# Patient Record
Sex: FEMALE | Race: WHITE | NOT HISPANIC OR LATINO | URBAN - METROPOLITAN AREA
[De-identification: names, ages, dates, MRNs, and addresses within clinical notes are randomized per-mention and may not be internally consistent; named-entity substitution may affect disease eponyms.]

---

## 2020-07-25 ENCOUNTER — TELEPHONE ENCOUNTER (OUTPATIENT)
Dept: URBAN - METROPOLITAN AREA CLINIC 13 | Facility: CLINIC | Age: 40
End: 2020-07-25

## 2020-07-26 ENCOUNTER — TELEPHONE ENCOUNTER (OUTPATIENT)
Dept: URBAN - METROPOLITAN AREA CLINIC 13 | Facility: CLINIC | Age: 40
End: 2020-07-26

## 2020-07-26 RX ORDER — FERROUS GLUCONATE 256(28)MG
TAKE 1 TABLET DAILY AS DIRECTED TABLET ORAL
Refills: 0 | Status: ACTIVE | COMMUNITY

## 2020-07-26 RX ORDER — ACETAMINOPHEN 325 MG/1
TAKE 1 TO 2 TABLETS EVERY 6 HOURS AS NEEDED TABLET, FILM COATED ORAL
Refills: 0 | Status: ACTIVE | COMMUNITY

## 2020-07-26 RX ORDER — DOCUSATE SODIUM 100 MG/1
TAKE 1 CAPSULE 3 TIMES DAILY CAPSULE ORAL
Refills: 0 | Status: ACTIVE | COMMUNITY

## 2020-07-26 RX ORDER — NITROGLYCERIN 4 MG/G
INSERT 1 APPLICATOR EVERY TWELVE HOURS OINTMENT RECTAL
Qty: 30 | Refills: 0 | Status: ACTIVE | COMMUNITY
Start: 2015-10-12

## 2024-10-04 ENCOUNTER — OFFICE VISIT (OUTPATIENT)
Dept: URBAN - METROPOLITAN AREA CLINIC 113 | Facility: CLINIC | Age: 44
End: 2024-10-04
Payer: COMMERCIAL

## 2024-10-04 ENCOUNTER — LAB OUTSIDE AN ENCOUNTER (OUTPATIENT)
Dept: URBAN - METROPOLITAN AREA CLINIC 113 | Facility: CLINIC | Age: 44
End: 2024-10-04

## 2024-10-04 VITALS
WEIGHT: 250.8 LBS | DIASTOLIC BLOOD PRESSURE: 82 MMHG | SYSTOLIC BLOOD PRESSURE: 117 MMHG | RESPIRATION RATE: 14 BRPM | HEART RATE: 79 BPM | TEMPERATURE: 98.1 F | HEIGHT: 69 IN | BODY MASS INDEX: 37.15 KG/M2

## 2024-10-04 DIAGNOSIS — D50.8 OTHER IRON DEFICIENCY ANEMIA: ICD-10-CM

## 2024-10-04 DIAGNOSIS — R19.8 IRREGULAR BOWEL HABITS: ICD-10-CM

## 2024-10-04 DIAGNOSIS — Z12.11 COLON CANCER SCREENING: ICD-10-CM

## 2024-10-04 PROBLEM — 87522002: Status: ACTIVE | Noted: 2024-10-04

## 2024-10-04 PROCEDURE — 99203 OFFICE O/P NEW LOW 30 MIN: CPT | Performed by: NURSE PRACTITIONER

## 2024-10-04 RX ORDER — DOCUSATE SODIUM 100 MG/1
TAKE 1 CAPSULE 3 TIMES DAILY CAPSULE ORAL
Refills: 0 | Status: ON HOLD | COMMUNITY

## 2024-10-04 RX ORDER — FERROUS GLUCONATE 256(28)MG
TAKE 1 TABLET DAILY AS DIRECTED TABLET ORAL
Refills: 0 | Status: ON HOLD | COMMUNITY

## 2024-10-04 RX ORDER — ACETAMINOPHEN 325 MG/1
TAKE 1 TO 2 TABLETS EVERY 6 HOURS AS NEEDED TABLET, FILM COATED ORAL
Refills: 0 | Status: ON HOLD | COMMUNITY

## 2024-10-04 RX ORDER — NITROGLYCERIN 4 MG/G
INSERT 1 APPLICATOR EVERY TWELVE HOURS OINTMENT RECTAL
Qty: 30 | Refills: 0 | Status: ON HOLD | COMMUNITY
Start: 2015-10-12

## 2024-10-04 RX ORDER — VENLAFAXINE HYDROCHLORIDE 75 MG/1
1 CAPSULE WITH FOOD CAPSULE, EXTENDED RELEASE ORAL ONCE A DAY
Status: ACTIVE | COMMUNITY

## 2024-10-04 NOTE — HPI-TODAY'S VISIT:
This is a 44-year-old female with a history of morbid obesity status post lap band, anxiety, iron deficiency anemia, GERD, and esophageal dysphagia status post normal manometry in 2008 referred from Ms. Damon NP for colon cancer screening. She was last seen in 2015 for follow-up regarding GERD, dysphagia, anal fissure, and iron deficiency anemia.  She had dysphagia and regurgitation that began during pregnancy and persisted for 6 months afterward.  She has spontaneously improved for the last 2 weeks.  It was discussed that malpositioned Lap-Band may be contributing to her symptoms.  An upper GI series and EGD were recommended.  Fiber and topical therapy were recommended for proctalgia and bleeding associated with an anal fissure.  Labs were recommended to assess trend and iron deficiency anemia.  Upper GI series showed marked delay in movement of barium through the area of Lap-Band narrowing of the proximal mid stomach and large amount of contrast retained in the proximal stomach and esophagus with extensive contractility of the distal esophagus noted.  These results were forwarded to Dr. Gong.  EGD was not performed. After her last office visit, she reports fluid was completely removed from the Lap-Band.  She has abdominal symptoms that coincide with ovulation or menses.  She has worsening heartburn for 1 or 2 weeks during ovulation for which she takes Tums at bedtime.  She has constipation for 2 days during ovulation and then will have diarrhea for 2 days followed by normal bowel movements.  Stool frequency is every 2 to 3 days.  She reports daily bowel movements in the past.  She started taking align 1 or 2 months ago and reports it has been somewhat helpful.  If she  requires frequent wiping after bowel movements, she reports red blood on the tissue from irritation.  She has abdominal pain associated with ovulation and menses.  She otherwise denies abdominal symptoms.  She is working with Dr. San to improve symptoms.  She has been on Wegovy for the last 2 weeks.  She reports stable anemia.  She reports that hemoglobin as low as 7.  Anemia is chronic and attributed to menses.  She is taking daily iron.

## 2024-10-05 ENCOUNTER — DASHBOARD ENCOUNTERS (OUTPATIENT)
Age: 44
End: 2024-10-05

## 2024-10-05 PROBLEM — 305058001: Status: ACTIVE | Noted: 2024-10-05

## 2024-10-05 PROBLEM — 444702007: Status: ACTIVE | Noted: 2024-10-05

## 2024-10-05 LAB
HEMATOCRIT: 37.2
HEMOGLOBIN: 12
MCH: 29.9
MCHC: 32.3
MCV: 92.8
MPV: 10.5
PLATELET COUNT: 331
RDW: 13
RED BLOOD CELL COUNT: 4.01
WHITE BLOOD CELL COUNT: 6.7

## 2024-11-26 ENCOUNTER — OFFICE VISIT (OUTPATIENT)
Dept: URBAN - METROPOLITAN AREA SURGERY CENTER 25 | Facility: SURGERY CENTER | Age: 44
End: 2024-11-26

## 2025-01-03 ENCOUNTER — OFFICE VISIT (OUTPATIENT)
Dept: URBAN - METROPOLITAN AREA CLINIC 113 | Facility: CLINIC | Age: 45
End: 2025-01-03

## 2025-01-10 ENCOUNTER — TELEPHONE ENCOUNTER (OUTPATIENT)
Dept: URBAN - METROPOLITAN AREA CLINIC 113 | Facility: CLINIC | Age: 45
End: 2025-01-10

## 2025-01-13 ENCOUNTER — OFFICE VISIT (OUTPATIENT)
Dept: URBAN - METROPOLITAN AREA SURGERY CENTER 25 | Facility: SURGERY CENTER | Age: 45
End: 2025-01-13

## 2025-01-27 ENCOUNTER — OFFICE VISIT (OUTPATIENT)
Dept: URBAN - METROPOLITAN AREA CLINIC 113 | Facility: CLINIC | Age: 45
End: 2025-01-27

## 2025-02-19 ENCOUNTER — CLAIMS CREATED FROM THE CLAIM WINDOW (OUTPATIENT)
Dept: URBAN - METROPOLITAN AREA SURGERY CENTER 25 | Facility: SURGERY CENTER | Age: 45
End: 2025-02-19
Payer: COMMERCIAL

## 2025-02-19 ENCOUNTER — CLAIMS CREATED FROM THE CLAIM WINDOW (OUTPATIENT)
Dept: URBAN - METROPOLITAN AREA CLINIC 4 | Facility: CLINIC | Age: 45
End: 2025-02-19
Payer: COMMERCIAL

## 2025-02-19 DIAGNOSIS — K57.30 COLON, DIVERTICULOSIS: ICD-10-CM

## 2025-02-19 DIAGNOSIS — Z12.11 COLON CANCER SCREENING: ICD-10-CM

## 2025-02-19 DIAGNOSIS — Z12.11 COLON CANCER SCREENING (HIGH RISK): ICD-10-CM

## 2025-02-19 DIAGNOSIS — D12.3 ADENOMA OF TRANSVERSE COLON: ICD-10-CM

## 2025-02-19 DIAGNOSIS — D12.3 BENIGN NEOPLASM OF TRANSVERSE COLON: ICD-10-CM

## 2025-02-19 DIAGNOSIS — D12.3 ADENOMATOUS POLYP OF TRANSVERSE COLON: ICD-10-CM

## 2025-02-19 PROCEDURE — 88305 TISSUE EXAM BY PATHOLOGIST: CPT | Performed by: PATHOLOGY

## 2025-02-19 PROCEDURE — 00812 ANES LWR INTST SCR COLSC: CPT | Performed by: ANESTHESIOLOGY

## 2025-02-19 PROCEDURE — 45385 COLONOSCOPY W/LESION REMOVAL: CPT | Performed by: INTERNAL MEDICINE

## 2025-02-19 PROCEDURE — 00812 ANES LWR INTST SCR COLSC: CPT | Performed by: NURSE ANESTHETIST, CERTIFIED REGISTERED

## 2025-02-19 RX ORDER — VENLAFAXINE HYDROCHLORIDE 75 MG/1
1 CAPSULE WITH FOOD CAPSULE, EXTENDED RELEASE ORAL ONCE A DAY
Status: ACTIVE | COMMUNITY

## 2025-02-19 RX ORDER — NITROGLYCERIN 4 MG/G
INSERT 1 APPLICATOR EVERY TWELVE HOURS OINTMENT RECTAL
Qty: 30 | Refills: 0 | Status: ON HOLD | COMMUNITY
Start: 2015-10-12

## 2025-02-19 RX ORDER — DOCUSATE SODIUM 100 MG/1
TAKE 1 CAPSULE 3 TIMES DAILY CAPSULE ORAL
Refills: 0 | Status: ON HOLD | COMMUNITY

## 2025-02-19 RX ORDER — FERROUS GLUCONATE 256(28)MG
TAKE 1 TABLET DAILY AS DIRECTED TABLET ORAL
Refills: 0 | Status: ON HOLD | COMMUNITY

## 2025-02-19 RX ORDER — ACETAMINOPHEN 325 MG/1
TAKE 1 TO 2 TABLETS EVERY 6 HOURS AS NEEDED TABLET, FILM COATED ORAL
Refills: 0 | Status: ON HOLD | COMMUNITY

## 2025-03-06 ENCOUNTER — OFFICE VISIT (OUTPATIENT)
Dept: URBAN - METROPOLITAN AREA CLINIC 113 | Facility: CLINIC | Age: 45
End: 2025-03-06